# Patient Record
Sex: MALE | Race: WHITE | NOT HISPANIC OR LATINO | URBAN - METROPOLITAN AREA
[De-identification: names, ages, dates, MRNs, and addresses within clinical notes are randomized per-mention and may not be internally consistent; named-entity substitution may affect disease eponyms.]

---

## 2019-01-14 ENCOUNTER — COMMUNICATION - HEALTHEAST (OUTPATIENT)
Dept: LAB | Facility: CLINIC | Age: 19
End: 2019-01-14

## 2019-01-14 ENCOUNTER — OFFICE VISIT - HEALTHEAST (OUTPATIENT)
Dept: FAMILY MEDICINE | Facility: CLINIC | Age: 19
End: 2019-01-14

## 2019-01-14 DIAGNOSIS — F45.21 HYPOCHONDRIASIS: ICD-10-CM

## 2019-01-14 DIAGNOSIS — F41.9 ANXIETY: ICD-10-CM

## 2019-01-14 DIAGNOSIS — Z13.220 SCREENING FOR LIPOID DISORDERS: ICD-10-CM

## 2019-01-14 DIAGNOSIS — Z78.9 ALCOHOL USE: ICD-10-CM

## 2019-01-14 ASSESSMENT — MIFFLIN-ST. JEOR: SCORE: 1687.78

## 2019-01-15 ENCOUNTER — COMMUNICATION - HEALTHEAST (OUTPATIENT)
Dept: INTERNAL MEDICINE | Facility: CLINIC | Age: 19
End: 2019-01-15

## 2019-01-15 ENCOUNTER — AMBULATORY - HEALTHEAST (OUTPATIENT)
Dept: LAB | Facility: CLINIC | Age: 19
End: 2019-01-15

## 2019-01-15 DIAGNOSIS — Z13.220 SCREENING FOR LIPOID DISORDERS: ICD-10-CM

## 2019-01-15 DIAGNOSIS — Z78.9 ALCOHOL USE: ICD-10-CM

## 2019-01-15 LAB
ALBUMIN SERPL-MCNC: 4.6 G/DL (ref 3.5–5)
ALP SERPL-CCNC: 44 U/L (ref 45–120)
ALT SERPL W P-5'-P-CCNC: 19 U/L (ref 0–45)
AST SERPL W P-5'-P-CCNC: 18 U/L (ref 0–40)
BILIRUB DIRECT SERPL-MCNC: 0.3 MG/DL
BILIRUB SERPL-MCNC: 1.1 MG/DL (ref 0–1)
CHOLEST SERPL-MCNC: 221 MG/DL
FASTING STATUS PATIENT QL REPORTED: YES
HDLC SERPL-MCNC: 64 MG/DL
LDLC SERPL CALC-MCNC: 143 MG/DL
PROT SERPL-MCNC: 7.6 G/DL (ref 6–8)
TRIGL SERPL-MCNC: 72 MG/DL

## 2021-06-02 VITALS — WEIGHT: 150.25 LBS | BODY MASS INDEX: 21.51 KG/M2 | HEIGHT: 70 IN

## 2021-06-16 PROBLEM — F45.21 HYPOCHONDRIASIS: Status: ACTIVE | Noted: 2019-01-14

## 2021-06-23 NOTE — PROGRESS NOTES
1. Anxiety     2. Hypochondriasis          Plan: We discussed his anxiety issues, which seem to center mostly around perceived sense of illness or concern that he may have something going on that will be undiagnosed and lead to his demise.  We discussed not going on Google to generically search for things, and I gave him a couple of websites that I recommended that he could do some search is on such as [employer] or the [facility] website.  This may also get better once he gets back to school as right now he does not have a lot to do and is really just focused on physical symptoms.  We discussed doing some medication but he does not want to do that at this point, he is also seeing a psychiatrist and that may be of some benefit as well.    30 minutes spent together with the patient today, more than 50% spent in counseling, discussing the above topics.       Subjective: 18-year-old male who is here today with concerns about some anxiety.  He states that over the last month or so he has been more anxious and concerned about symptoms that he is having and concerned that they may be leading to something really bad.  He has been doing a lot of gruel searches with his symptoms and that has not helped at all and in fact is made it worse.  He is a student at the [employer] and is home on his winter break and then has had more free time which has led him to do a lot of Internet searching on the symptoms.  He seems to think that his interpersonal relationships are okay, and he does not really have any concerns with his family they are very supportive of him.  He does see a psychiatrist for some general issues and this came up in the course of that.  He is not really interested in medication if he can help it but he thought he would come in just to get checked out, he would like to have some blood work done just to make sure that he is okay.    He is a college student has mentioned and does do some binge drinking so is  concerned about his liver and he like to have that checked out.  He also has some lightheadedness upon waking and getting up too quickly and he would just like to have that checked out as well.    Patient is new patient to the clinic. Please see past medical history, surgical history, social history and family history, all of which were completed in their entirety today.         Objective: Well-appearing male in no acute distress.  Vital signs as noted.  Ears are clear bilaterally.  Eyes and nose are normal.  Oropharynx is clear.  Carotids show no bruit.  Chest clear to auscultation.  Heart regular rate without murmurs rubs or gallops.  Abdomen soft nontender nondistended bowel sounds present all quadrants no hepatosplenomegaly noted.

## 2021-06-23 NOTE — TELEPHONE ENCOUNTER
Patient is coming in 1/15 for a lab only appointment. States for fasting labs. No orders are available. Please review and order if needed.

## 2021-06-23 NOTE — TELEPHONE ENCOUNTER
----- Message from Zay Sanchez MD sent at 1/15/2019  3:57 PM CST -----  Liver function tests are fine.    Cholesterol is a bit high, but not too bad.  I'd just work on diet a bit, and try to get regular exercise, and we can recheck in a year.    TS

## 2021-07-03 NOTE — ADDENDUM NOTE
Addendum Note by Zay Sanchez MD at 1/14/2019 10:40 AM     Author: Zay Sanchez MD Service: -- Author Type: Physician    Filed: 1/14/2019  4:21 PM Encounter Date: 1/14/2019 Status: Signed    : Zay Sanchez MD (Physician)    Addended by: ZAY SANCHEZ on: 1/14/2019 04:21 PM        Modules accepted: Orders